# Patient Record
Sex: MALE | Race: WHITE | URBAN - METROPOLITAN AREA
[De-identification: names, ages, dates, MRNs, and addresses within clinical notes are randomized per-mention and may not be internally consistent; named-entity substitution may affect disease eponyms.]

---

## 2018-01-25 ENCOUNTER — OFFICE VISIT (OUTPATIENT)
Dept: DERMATOLOGY | Facility: CLINIC | Age: 28
End: 2018-01-25
Payer: COMMERCIAL

## 2018-01-25 DIAGNOSIS — Z80.8 FAMILY HISTORY OF MELANOMA: ICD-10-CM

## 2018-01-25 DIAGNOSIS — D22.9 MULTIPLE PIGMENTED NEVI: ICD-10-CM

## 2018-01-25 DIAGNOSIS — D48.5 NEOPLASM OF UNCERTAIN BEHAVIOR OF SKIN: ICD-10-CM

## 2018-01-25 DIAGNOSIS — Z12.83 SKIN CANCER SCREENING: Primary | ICD-10-CM

## 2018-01-25 ASSESSMENT — PAIN SCALES - GENERAL: PAINLEVEL: NO PAIN (0)

## 2018-01-25 NOTE — LETTER
1/25/2018       RE: Trsiton Carranza  [address]    [facility] [address]     Dear Colleague,    Thank you for referring your patient, Triston Carranza, to the [facility] DERMATOLOGY at [facility]. Please see a copy of my visit note below.    [facility] Dermatology Note      Dermatology Problem List:  1.Skin cancer screening, 01/25/18   2. NUB, s/p shave biopsy 1/25/18  - left posterior shoulder   - left lower back     Encounter Date: Jan 25, 2018    CC:  Chief Complaint   Patient presents with     Skin Check     Skin check, no lesions of concern noted. Triston notes family hx of melanoma.       History of Present Illness:  Mr. Triston Carranza is a 27 year old male who presents today for a skin check as a new patient. The patient explains he has previously had several biopsies on his back performed by his dermatologist in [place], [place]. He believes they came back slightly abnormal and therefore re-excision was done with margins. These areas have left large scars per patient, but he hasn't noticed any other changes in the areas of previous biopsy sites. He otherwise has noticed a small amount of itching to the back but assumed this was secondary to dry skin; denies history of eczema. Has recently started using a moisturizer.     The patient otherwise denies any changing moles areas of bleeding or bruising     Past Medical History:   There is no problem list on file for this patient.    History reviewed. No pertinent past medical history.  History reviewed. No pertinent surgical history.    Social History:  The patient endorses frequent sun exposure. The patient denies use of tanning beds. He is originally from [place], [place]. [place] a  [place]     Family History:  There is a family history of melanoma in his mother (who is currently doing well, negative lymph nodes per patient).     Medications:  No  current outpatient prescriptions on file.       No Known Allergies    Review of Systems:  -Skin/Heme New Pt: The patient admits to frequent sun exposure. The patient denies excessive scarring or problems healing except as per HPI. The patient denies excessive bleeding.  -Constitutional: The patient denies fatigue, fevers, chills, unintended weight loss, and night sweats.  -HEENT: Patient denies nonhealing oral sores.  -Skin: As above in HPI. No additional skin concerns.    Physical exam:  Vitals: There were no vitals taken for this visit.  GEN: This is a well developed, well-nourished male in no acute distress, in a pleasant mood.    SKIN: Full skin, which includes the head/face, both arms, chest, back, abdomen,both legs, groin buttocks, digits and/or nails, was examined.  - Elliptical shaped surgical scars on the central and lower back.  -Multiple regular brown pigmented macules and papules are identified on the trunk and extremities.   -Brown variegated 1.2 cm asymmetric patch on the left lower back   -8 mm pink and brown asymmetric macule on the left posterior shoulder   -No other lesions of concern on areas examined.     Impression/Plan:  1. Neoplasm of uncertain behavior on the left lower back and left posterior shoulder. The differential diagnosis includes dysplastic nevus vs enign nevus vs other.     Shave biopsy:  After discussion of benefits and risks including but not limited to bleeding/bruising, pain/swelling, infection, scar, incomplete removal, nerve damage/numbness, recurrence, and non-diagnostic biopsy, written consent, verbal consent and photographs were obtained. Time-out was performed. The area was cleaned with isopropyl alcohol.  was injected to obtain adequate anesthesia of the lesion on the left posterior shoulder and left lower back. 2 ml of 1% lidocaine was injected to obtain adequate anesthesia. A  shave biopsy was performed. Hemostasis was achieved with aluminium chloride. Vaseline and a  sterile dressing were applied. The patient tolerated the procedure and no complications were noted. The patient was provided with verbal and written post care instructions.     2. Multiple benign nevi     ABCDs of melanoma were discussed and self skin checks were advised.     Sun precaution was advised including the use of sun screens of SPF 30 or higher, sun protective clothing, and avoidance of tanning beds.    Follow-up in 1 year, or pending biopsy results.     Staff Involved:  Scribe Disclosure:   I, Adeola Puga, am serving as a scribe to document services personally performed by Ernestina Michel PA-C, based on data collection and the provider's statements to me.    Provider Disclosure:   The documentation recorded by the scribe accurately reflects the services I personally performed and the decisions made by me.    All risks, benefits and alternatives were discussed with patient.  Patient is in agreement and understands the assessment and plan.  All questions were answered.  Sun Screen Education was given.   Return to Clinic 1 year or sooner as needed.   Ernestina Michel PA-C   [facility]       Again, thank you for allowing me to participate in the care of your patient.      Sincerely,    Ernestina Michel PA-C

## 2018-01-25 NOTE — NURSING NOTE
Dermatology Rooming Note    Triston Carranza's goals for this visit include:   Chief Complaint   Patient presents with     Skin Check     Skin check, no lesions of concern noted. Triston notes family hx of melanoma.     Radha Munguia LPN

## 2018-01-25 NOTE — PROGRESS NOTES
[facility] Dermatology Note      Dermatology Problem List:  1.Skin cancer screening, 01/25/18   2. NUB, s/p shave biopsy 1/25/18  - left posterior shoulder   - left lower back     Encounter Date: Jan 25, 2018    CC:  Chief Complaint   Patient presents with     Skin Check     Skin check, no lesions of concern noted. Triston notes family hx of melanoma.       History of Present Illness:  Mr. Triston Carranza is a 27 year old male who presents today for a skin check as a new patient. The patient explains he has previously had several biopsies on his back performed by his dermatologist in [place], [place]. He believes they came back slightly abnormal and therefore re-excision was done with margins. These areas have left large scars per patient, but he hasn't noticed any other changes in the areas of previous biopsy sites. He otherwise has noticed a small amount of itching to the back but assumed this was secondary to dry skin; denies history of eczema. Has recently started using a moisturizer.     The patient otherwise denies any changing moles areas of bleeding or bruising     Past Medical History:   There is no problem list on file for this patient.    History reviewed. No pertinent past medical history.  History reviewed. No pertinent surgical history.    Social History:  The patient endorses frequent sun exposure. The patient denies use of tanning beds. He is originally from [place], [place]. Patient works as a  in [place], [place].     Family History:  There is a family history of melanoma in his mother (who is currently doing well, negative lymph nodes per patient).     Medications:  No current outpatient prescriptions on file.       No Known Allergies    Review of Systems:  -Skin/Heme New Pt: The patient admits to frequent sun exposure. The patient denies excessive scarring or problems healing except as per HPI. The patient denies excessive bleeding.  -Constitutional: The  patient denies fatigue, fevers, chills, unintended weight loss, and night sweats.  -HEENT: Patient denies nonhealing oral sores.  -Skin: As above in HPI. No additional skin concerns.    Physical exam:  Vitals: There were no vitals taken for this visit.  GEN: This is a well developed, well-nourished male in no acute distress, in a pleasant mood.    SKIN: Full skin, which includes the head/face, both arms, chest, back, abdomen,both legs, groin buttocks, digits and/or nails, was examined.  - Elliptical shaped surgical scars on the central and lower back.  -Multiple regular brown pigmented macules and papules are identified on the trunk and extremities.   -Brown variegated 1.2 cm asymmetric patch on the left lower back   -8 mm pink and brown asymmetric macule on the left posterior shoulder   -No other lesions of concern on areas examined.     Impression/Plan:  1. Neoplasm of uncertain behavior on the left lower back and left posterior shoulder. The differential diagnosis includes dysplastic nevus vs enign nevus vs other.     Shave biopsy:  After discussion of benefits and risks including but not limited to bleeding/bruising, pain/swelling, infection, scar, incomplete removal, nerve damage/numbness, recurrence, and non-diagnostic biopsy, written consent, verbal consent and photographs were obtained. Time-out was performed. The area was cleaned with isopropyl alcohol.  was injected to obtain adequate anesthesia of the lesion on the left posterior shoulder and left lower back. 2 ml of 1% lidocaine was injected to obtain adequate anesthesia. A  shave biopsy was performed. Hemostasis was achieved with aluminium chloride. Vaseline and a sterile dressing were applied. The patient tolerated the procedure and no complications were noted. The patient was provided with verbal and written post care instructions.     2. Multiple benign nevi     ABCDs of melanoma were discussed and self skin checks were advised.     Sun precaution  was advised including the use of sun screens of SPF 30 or higher, sun protective clothing, and avoidance of tanning beds.    Follow-up in 1 year, or pending biopsy results.     Staff Involved:  Scribe Disclosure:   I, Adeola Puga, am serving as a scribe to document services personally performed by Ernestina Michel PA-C, based on data collection and the provider's statements to me.    Provider Disclosure:   The documentation recorded by the scribe accurately reflects the services I personally performed and the decisions made by me.    All risks, benefits and alternatives were discussed with patient.  Patient is in agreement and understands the assessment and plan.  All questions were answered.  Sun Screen Education was given.   Return to Clinic 1 year or sooner as needed.   Ernestina Michel PA-C   [facility]

## 2018-01-25 NOTE — MR AVS SNAPSHOT
After Visit Summary   1/25/2018    Triston Carranza    MRN: [id number]           Patient Information     Date Of Birth          1990        Visit Information        Provider Department      1/25/2018 12:30 PM Ernestina Michel PA-C [facility] Dermatology        Today's Diagnoses     Skin cancer screening    -  1    Family history of melanoma        Multiple pigmented nevi        Neoplasm of uncertain behavior of skin           Follow-ups after your visit        Follow-up notes from your care team     Return in about 1 year (around 1/25/2019).      Who to contact     Please call your clinic at [phone] to:    Ask questions about your health    Make or cancel appointments    Discuss your medicines    Learn about your test results    Speak to your doctor   If you have compliments or concerns about an experience at your clinic, or if you wish to file a complaint, please contact [facility] Patient Relations at [phone] or email us at [email or web address]         Additional Information About Your Visit        [employer] Information     [employer] gives you secure access to your electronic health record. If you see a primary care provider, you can also send messages to your care team and make appointments. If you have questions, please call your primary care clinic.  If you do not have a primary care provider, please call [phone] and they will assist you.      [employer] is an electronic gateway that provides easy, online access to your medical records. With [employer], you can request a clinic appointment, read your test results, renew a prescription or communicate with your care team.     To access your existing account, please contact your [facility] or call [phone] for assistance.        Care EveryWhere ID     This is your Care EveryWhere ID. This could be used by other organizations to access your [facility]  records  [id number]         Blood Pressure from Last 3 Encounters:   No data found for BP    Weight from Last 3 Encounters:   No data found for Wt              We Performed the Following     BIOPSY SKIN/SUBQ/MUC MEM, EACH ADDTL LESION     BIOPSY SKIN/SUBQ/MUC MEM, SINGLE LESION     Surgical pathology exam        Primary Care Provider    None Specified       No primary provider on file.        Equal Access to Services     [facility]: Hadii aad ku hadletao Sodebbieali, waaxda luqadaha, qaybta kaalmada kimberlybrittanieda, humberto clemente maria del rosariomikie bill mikeonelia paez . So [facility] [phone].    ATENCIÓN: Si habla español, tiene a castro disposición servicios gratuitos de asistencia lingüística. Llame al [phone].    We comply with applicable federal civil rights laws and [place] laws. We do not discriminate on the basis of race, color, national origin, age, disability, sex, sexual orientation, or gender identity.            Thank you!     Thank you for choosing [facility] DERMATOLOGY  for your care. Our goal is always to provide you with excellent care. Hearing back from our patients is one way we can continue to improve our services. Please take a few minutes to complete the written survey that you may receive in the mail after your visit with us. Thank you!             Your Updated Medication List - Protect others around you: Learn how to safely use, store and throw away your medicines at www.disposemymeds.org.      Notice  As of 1/25/2018  1:38 PM    You have not been prescribed any medications.

## 2018-01-25 NOTE — LETTER
Date:January 29, 2018      Patient was self referred, no letter generated. Do not send.        [facility] Health Information

## 2018-01-26 LAB — COPATH REPORT: NORMAL

## 2018-02-22 ENCOUNTER — TELEPHONE (OUTPATIENT)
Dept: DERMATOLOGY | Facility: CLINIC | Age: 28
End: 2018-02-22

## 2018-02-22 NOTE — TELEPHONE ENCOUNTER
Calling to let patient know his biopsy results. Left message for him to call clinic back at his convience.    Kacie Wallace CMA

## 2018-08-29 ENCOUNTER — OFFICE VISIT (OUTPATIENT)
Dept: DERMATOLOGY | Facility: CLINIC | Age: 28
End: 2018-08-29
Payer: COMMERCIAL

## 2018-08-29 DIAGNOSIS — L71.0 PERIORIFICIAL DERMATITIS: Primary | ICD-10-CM

## 2018-08-29 DIAGNOSIS — L72.3 INFLAMED SEBACEOUS CYST: ICD-10-CM

## 2018-08-29 RX ORDER — MINOCYCLINE HYDROCHLORIDE 100 MG/1
100 CAPSULE ORAL 2 TIMES DAILY
Qty: 20 CAPSULE | Refills: 1 | Status: SHIPPED | OUTPATIENT
Start: 2018-08-29

## 2018-08-29 RX ORDER — METRONIDAZOLE 7.5 MG/G
GEL TOPICAL 2 TIMES DAILY
Qty: 45 G | Refills: 11 | Status: SHIPPED | OUTPATIENT
Start: 2018-08-29

## 2018-08-29 ASSESSMENT — PAIN SCALES - GENERAL: PAINLEVEL: NO PAIN (0)

## 2018-08-29 NOTE — PROGRESS NOTES
[facility] Dermatology Note      Dermatology Problem List:  1.Skin cancer screening, 01/25/18   2. Family hx melanoma   3. Sebaceous cyst- inflamed, medial right axilla   - minocycline 100 mg BID   4. Perioral dermatitis   - metronidazole 0.75% gel      Encounter Date: Aug 29, 2018    CC:  Chief Complaint   Patient presents with     Derm Problem     Triston is here for concerning areas around his eyes. Also has a cyst in his Right axilla.       History of Present Illness:  Mr. Triston Carranza is a 27 year old male who presents today for two concerns. The patient was last seen in the dermatology clinic for a skin check on 1/25/18 during which 2 NUB were biopsied from his left lower back and left posterior shoulder. These lesions returned from pathology as benign nevi.    Today the patient reports that about 2-3 weeks ago he started getting a persistent rash around his eyes. The rash is sort of spreading but for the most part it is isolated around his eyes. He reports that sometimes the rash burns and becomes painful. He reports using different brands of sunscreens and facial scrubs lately.     He also reports that he has a cyst in his right arm pit. He reports that today it is draining and it is bothersome as it rubs on clothing. It has recently been growing in size. He has since stopped using deodorant . He denies the presence of cysts in his left arm pit or anywhere else on his body.     Other wise he denies any additional lesions of concerns.      Past Medical History:   There is no problem list on file for this patient.    No past medical history on file.  No past surgical history on file.    Social History:  The patient endorses frequent sun exposure. The patient denies use of tanning beds. He is originally from [place], [place]. Patient works as a  in [place], [place].     Family History:  There is a family history of melanoma in his mother (who is currently doing well,  negative lymph nodes per patient).     Medications:  No current outpatient prescriptions on file.       No Known Allergies    Review of Systems:  -Constitutional: The patient is feeling generally well   -Skin: As above in HPI. No additional skin concerns.    Physical exam:  Vitals: There were no vitals taken for this visit.  GEN: This is a well developed, well-nourished male in no acute distress, in a pleasant mood.    SKIN: A focused exam of the face and right axilla was performed. Exam significant for:   -Multiple regular brown pigmented macules and papules are identified on the trunk and extremities.   -There is a tender pink nodule to the medial right axilla with slight drainage   - There are 1-2 mm pink scaly papules on the right upper lateral cheeks   -No other lesions of concern on areas examined.     Impression/Plan:  1. Perioral dermatitis (mainly periorificial)    Recommend avoiding applying any facial scrubs or new products to the skin     Recommend use of gentle cleansers     Start metronidazole 0.75% gel, apply BID to affected areas on face until resolved     Informational pamphlet provided at today's visit     2. Sebaceous cyst- inflamed, medial right axilla     Bacterial swab taken of right axilla to rule out infection     Start minocycline 100 mg BID x 10 days. Discussed potential side effects of minocycline.     Follow-up in January for annual total body skin exam, earlier if any new/changing lesions    Staff Involved:  Scribe/Staff    Scribe Disclosure:   ALONA, Sharonda Mahmood, am serving as a scribe to document services personally performed by Ernestina Michel PA-C, based on data collection and the provider's statements to me.    Provider Disclosure:   The documentation recorded by the scribe accurately reflects the services I personally performed and the decisions made by me.    All risks, benefits and alternatives were discussed with patient.  Patient is in agreement and understands the assessment and  plan.  All questions were answered.  Sun Screen Education was given.   Return to Clinic 5 months or sooner as needed.   Ernestina Michel PA-C   [facility]ermat[facility]

## 2018-08-29 NOTE — LETTER
8/29/2018     RE: Triston Carranza  [address]  [address]  [facility] [address]     Dear Colleague,    Thank you for referring your patient, Triston Carranza, to the [facility] DERMATOLOGY at [facility]. Please see a copy of my visit note below.    [facility] Dermatology Note      Dermatology Problem List:  1.Skin cancer screening, 01/25/18   2. Family hx melanoma   3. Sebaceous cyst- inflamed, medial right axilla   - minocycline 100 mg BID   4. Perioral dermatitis   - metronidazole 0.75% gel      Encounter Date: Aug 29, 2018    CC:  Chief Complaint   Patient presents with     Derm Problem     Triston is here for concerning areas around his eyes. Also has a cyst in his Right axilla.       History of Present Illness:  Mr. Triston Carranza is a 27 year old male who presents today for two concerns. The patient was last seen in the dermatology clinic for a skin check on 1/25/18 during which 2 NUB were biopsied from his left lower back and left posterior shoulder. These lesions returned from pathology as benign nevi.    Today the patient reports that about 2-3 weeks ago he started getting a persistent rash around his eyes. The rash is sort of spreading but for the most part it is isolated around his eyes. He reports that sometimes the rash burns and becomes painful. He reports using different brands of sunscreens and facial scrubs lately.     He also reports that he has a cyst in his right arm pit. He reports that today it is draining and it is bothersome as it rubs on clothing. It has recently been growing in size. He has since stopped using deodorant . He denies the presence of cysts in his left arm pit or anywhere else on his body.     Other wise he denies any additional lesions of concerns.      Past Medical History:   There is no problem list on file for this patient.    No past medical history on file.  No past surgical history on file.    Social History:  The  patient endorses frequent sun exposure. The patient denies use of tanning beds. He is originally from [place], [place]. Patient works as a  in downtown [place], [place].     Family History:  There is a family history of melanoma in his mother (who is currently doing well, negative lymph nodes per patient).     Medications:  No current outpatient prescriptions on file.       No Known Allergies    Review of Systems:  -Constitutional: The patient is feeling generally well   -Skin: As above in HPI. No additional skin concerns.    Physical exam:  Vitals: There were no vitals taken for this visit.  GEN: This is a well developed, well-nourished male in no acute distress, in a pleasant mood.    SKIN: A focused exam of the face and right axilla was performed. Exam significant for:   -Multiple regular brown pigmented macules and papules are identified on the trunk and extremities.   -There is a tender pink nodule to the medial right axilla with slight drainage   - There are 1-2 mm pink scaly papules on the right upper lateral cheeks   -No other lesions of concern on areas examined.     Impression/Plan:  1. Perioral dermatitis (mainly periorificial)    Recommend avoiding applying any facial scrubs or new products to the skin     Recommend use of gentle cleansers     Start metronidazole 0.75% gel, apply BID to affected areas on face until resolved     Informational pamphlet provided at today's visit     2. Sebaceous cyst- inflamed, medial right axilla     Bacterial swab taken of right axilla to rule out infection     Start minocycline 100 mg BID x 10 days. Discussed potential side effects of minocycline.     Follow-up in January for annual total body skin exam, earlier if any new/changing lesions    Staff Involved:  Scribe/Staff    Scribe Disclosure:   Sharonda SAGE, am serving as a scribe to document services personally performed by Ernestina Michel PA-C, based on data collection and the provider's statements to  me.    Provider Disclosure:   The documentation recorded by the scribe accurately reflects the services I personally performed and the decisions made by me.    All risks, benefits and alternatives were discussed with patient.  Patient is in agreement and understands the assessment and plan.  All questions were answered.  Sun Screen Education was given.   Return to Clinic 5 months or sooner as needed.   Ernestina Michel PA-C   [facility] Dermatology [facility]

## 2018-08-29 NOTE — NURSING NOTE
Dermatology Rooming Note    Triston Carranza's goals for this visit include:   Chief Complaint   Patient presents with     Derm Problem     Triston is here for concerning areas around his eyes. Also has a cyst in his Right axilla.       Julia Gagnon LPN

## 2018-08-29 NOTE — MR AVS SNAPSHOT
After Visit Summary   8/29/2018    Triston Carranza    MRN: [id number]           Patient Information     Date Of Birth          1990        Visit Information        Provider Department      8/29/2018 12:45 PM Ernestina Michel PA-C [facility]        Today's Diagnoses     Periorificial dermatitis    -  1    Inflamed sebaceous cyst           Follow-ups after your visit        Who to contact     Please call your clinic at [phone] to:    Ask questions about your health    Make or cancel appointments    Discuss your medicines    Learn about your test results    Speak to your doctor            Additional Information About Your Visit        MyChart Information     In[employer]ient gives you secure access to your electronic health record. If you see a primary care provider, you can also send messages to your care team and make appointments. If you have questions, please call your primary care clinic.  If you do not have a primary care provider, please call [phone] and they will assist you.      Incipient is an electronic gateway that provides easy, online access to your medical records. With [employer], you can request a clinic appointment, read your test results, renew a prescription or communicate with your care team.     To access your existing account, please contact your [facility] or call [phone] for assistance.        Care EveryWhere ID     This is your Care EveryWhere ID. This could be used by other organizations to access your [place] medical records  [id number]         Blood Pressure from Last 3 Encounters:   No data found for BP    Weight from Last 3 Encounters:   No data found for Wt              We Performed the Following     Skin Culture Aerobic Bacterial          Today's Medication Changes          These changes are accurate as of 8/29/18  1:01 PM.  If you have any questions, ask your nurse or doctor.               Start taking these  medicines.        Dose/Directions    metroNIDAZOLE 0.75 % topical gel   Commonly known as:  METROGEL   Used for:  Periorificial dermatitis   Started by:  Ernestina Michel PA-C        Apply topically 2 times daily Until rash is resolved.   Quantity:  45 g   Refills:  11       minocycline 100 MG capsule   Commonly known as:  MINOCIN/DYNACIN   Used for:  Inflamed sebaceous cyst, Periorificial dermatitis   Started by:  Ernestina Michel PA-C        Dose:  100 mg   Take 1 capsule (100 mg) by mouth 2 times daily   Quantity:  20 capsule   Refills:  1            Where to get your medicines      These medications were sent to [facility] [address] [place] [address] AT [address] [address]    Hours:  24-hours Phone:  [phone]     metroNIDAZOLE 0.75 % topical gel    minocycline 100 MG capsule                Primary Care Provider    None Specified       No primary provider on file.        Equal Access to Services     EVELYN KLEIN : Sai Snell, waaxda luwinston, qaybta kaalmada adeken, humberto paez . So [facility] [phone].    ATENCIÓN: Si habla español, tiene a castro disposición servicios gratuitos de asistencia lingüística. Conner al [phone].    We comply with applicable federal civil rights laws and [place] laws. We do not discriminate on the basis of race, color, national origin, age, disability, sex, sexual orientation, or gender identity.            Thank you!     Thank you for choosing [facility]  for your care. Our goal is always to provide you with excellent care. Hearing back from our patients is one way we can continue to improve our services. Please take a few minutes to complete the written survey that you may receive in the mail after your visit with us. Thank you!             Your Updated Medication List - Protect others around you: Learn how to safely use, store and throw away your  medicines at www.disposemymeds.org.          This list is accurate as of 8/29/18  1:01 PM.  Always use your most recent med list.                   Brand Name Dispense Instructions for use Diagnosis    metroNIDAZOLE 0.75 % topical gel    METROGEL    45 g    Apply topically 2 times daily Until rash is resolved.    Periorificial dermatitis       minocycline 100 MG capsule    MINOCIN/DYNACIN    20 capsule    Take 1 capsule (100 mg) by mouth 2 times daily    Inflamed sebaceous cyst, Periorificial dermatitis

## 2018-08-31 LAB
BACTERIA SPEC CULT: NO GROWTH
SPECIMEN SOURCE: NORMAL

## 2018-09-04 ENCOUNTER — TELEPHONE (OUTPATIENT)
Dept: DERMATOLOGY | Facility: CLINIC | Age: 28
End: 2018-09-04

## 2018-09-04 NOTE — TELEPHONE ENCOUNTER
Spoke with patient about sx. Discussed possible side effects from minocycline (dizziness, nausea, headaches). Recommended discontinuing minocycline. His last dose was this morning. Cyst has resolved. Periorificial dermatitis resolved. He was seen in Urgent Care for sore throat. Negative for strep. Pending culture. Will check on patient again tomorrow but able to tolerate food and swallow.

## 2018-09-04 NOTE — TELEPHONE ENCOUNTER
[facility] Center    Phone Message    May a detailed message be left on voicemail: yes    Reason for Call: PT is having bad side effects on the RX minocycline (MINOCIN/DYNACIN) 100 MG capsule that was prescribed last week. PT reports severe headaches/migraines, dizziness, nausea, very sore throat and swelling.     Action Taken: Message routed to:  [facility] ([facility] DERM

## 2020-03-11 ENCOUNTER — HEALTH MAINTENANCE LETTER (OUTPATIENT)
Age: 30
End: 2020-03-11

## 2020-12-27 ENCOUNTER — HEALTH MAINTENANCE LETTER (OUTPATIENT)
Age: 30
End: 2020-12-27

## 2021-04-25 ENCOUNTER — HEALTH MAINTENANCE LETTER (OUTPATIENT)
Age: 31
End: 2021-04-25

## 2021-10-09 ENCOUNTER — HEALTH MAINTENANCE LETTER (OUTPATIENT)
Age: 31
End: 2021-10-09

## 2022-05-21 ENCOUNTER — HEALTH MAINTENANCE LETTER (OUTPATIENT)
Age: 32
End: 2022-05-21

## 2022-09-17 ENCOUNTER — HEALTH MAINTENANCE LETTER (OUTPATIENT)
Age: 32
End: 2022-09-17

## 2023-06-04 ENCOUNTER — HEALTH MAINTENANCE LETTER (OUTPATIENT)
Age: 33
End: 2023-06-04